# Patient Record
Sex: MALE | Race: WHITE
[De-identification: names, ages, dates, MRNs, and addresses within clinical notes are randomized per-mention and may not be internally consistent; named-entity substitution may affect disease eponyms.]

---

## 2017-07-21 ENCOUNTER — HOSPITAL ENCOUNTER (INPATIENT)
Dept: HOSPITAL 74 - YASAS | Age: 52
LOS: 5 days | Discharge: HOME | DRG: 773 | End: 2017-07-26
Attending: INTERNAL MEDICINE | Admitting: INTERNAL MEDICINE
Payer: COMMERCIAL

## 2017-07-21 VITALS — BODY MASS INDEX: 20 KG/M2

## 2017-07-21 DIAGNOSIS — F17.213: ICD-10-CM

## 2017-07-21 DIAGNOSIS — R82.90: ICD-10-CM

## 2017-07-21 DIAGNOSIS — F12.20: ICD-10-CM

## 2017-07-21 DIAGNOSIS — F11.23: Primary | ICD-10-CM

## 2017-07-21 DIAGNOSIS — Z99.89: ICD-10-CM

## 2017-07-21 DIAGNOSIS — G62.9: ICD-10-CM

## 2017-07-21 DIAGNOSIS — J45.20: ICD-10-CM

## 2017-07-21 DIAGNOSIS — R26.81: ICD-10-CM

## 2017-07-21 DIAGNOSIS — F14.20: ICD-10-CM

## 2017-07-21 DIAGNOSIS — R00.1: ICD-10-CM

## 2017-07-21 LAB
APPEARANCE UR: CLEAR
BACTERIA #/AREA URNS HPF: (no result) /HPF
BILIRUB UR STRIP.AUTO-MCNC: NEGATIVE MG/DL
CAOX CRY URNS QL MICRO: (no result) /HPF
COLOR UR: (no result)
HYALINE CASTS URNS QL MICRO: 2 /LPF
KETONES UR QL STRIP: (no result)
LEUKOCYTE ESTERASE UR QL STRIP.AUTO: NEGATIVE
MUCOUS THREADS URNS QL MICRO: (no result)
NITRITE UR QL STRIP: NEGATIVE
PH UR: 5 [PH] (ref 5–8)
PROT UR QL STRIP: (no result)
PROT UR QL STRIP: NEGATIVE
RBC # BLD AUTO: 4 /HPF (ref 0–3)
RBC # UR STRIP: NEGATIVE /UL
UROBILINOGEN UR STRIP-MCNC: NEGATIVE MG/DL (ref 0.2–1)
WBC # UR AUTO: 2 /HPF (ref 3–5)

## 2017-07-21 PROCEDURE — HZ2ZZZZ DETOXIFICATION SERVICES FOR SUBSTANCE ABUSE TREATMENT: ICD-10-PCS | Performed by: INTERNAL MEDICINE

## 2017-07-21 RX ADMIN — Medication SCH MG: at 22:17

## 2017-07-21 RX ADMIN — GABAPENTIN SCH MG: 100 CAPSULE ORAL at 22:19

## 2017-07-21 RX ADMIN — NAPROXEN SCH: 250 TABLET ORAL at 22:18
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
## 2017-07-22 LAB
ALBUMIN SERPL-MCNC: 3 G/DL (ref 3.4–5)
ALP SERPL-CCNC: 78 U/L (ref 45–117)
ALT SERPL-CCNC: 10 U/L (ref 12–78)
ANION GAP SERPL CALC-SCNC: 5 MMOL/L (ref 8–16)
AST SERPL-CCNC: 13 U/L (ref 15–37)
BILIRUB SERPL-MCNC: 0.5 MG/DL (ref 0.2–1)
CALCIUM SERPL-MCNC: 8.3 MG/DL (ref 8.5–10.1)
CO2 SERPL-SCNC: 31 MMOL/L (ref 21–32)
CREAT SERPL-MCNC: 0.9 MG/DL (ref 0.7–1.3)
DEPRECATED RDW RBC AUTO: 15.4 % (ref 11.9–15.9)
GLUCOSE SERPL-MCNC: 105 MG/DL (ref 74–106)
HIV 1 & 2 AB: NEGATIVE
HIV 1 AGP24: NEGATIVE
MCH RBC QN AUTO: 27.3 PG (ref 25.7–33.7)
MCHC RBC AUTO-ENTMCNC: 32.9 G/DL (ref 32–35.9)
MCV RBC: 82.9 FL (ref 80–96)
PLATELET # BLD AUTO: 241 K/MM3 (ref 134–434)
PMV BLD: 8.2 FL (ref 7.5–11.1)
PROT SERPL-MCNC: 5.7 G/DL (ref 6.4–8.2)
SP GR UR: >= 1.03 (ref 1–1.02)
WBC # BLD AUTO: 6.6 K/MM3 (ref 4–10)

## 2017-07-22 RX ADMIN — NICOTINE SCH MG: 14 PATCH, EXTENDED RELEASE TRANSDERMAL at 10:15

## 2017-07-22 RX ADMIN — Medication SCH TAB: at 10:14

## 2017-07-22 RX ADMIN — Medication SCH MG: at 22:16

## 2017-07-22 RX ADMIN — NAPROXEN SCH MG: 250 TABLET ORAL at 10:14

## 2017-07-22 RX ADMIN — GABAPENTIN SCH MG: 100 CAPSULE ORAL at 22:16

## 2017-07-22 RX ADMIN — GABAPENTIN SCH MG: 100 CAPSULE ORAL at 10:13

## 2017-07-22 RX ADMIN — NAPROXEN SCH: 250 TABLET ORAL at 22:17
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
## 2017-07-23 RX ADMIN — GABAPENTIN SCH MG: 100 CAPSULE ORAL at 11:22

## 2017-07-23 RX ADMIN — Medication SCH MG: at 22:09

## 2017-07-23 RX ADMIN — Medication SCH TAB: at 11:22

## 2017-07-23 RX ADMIN — GABAPENTIN SCH MG: 100 CAPSULE ORAL at 22:11

## 2017-07-23 RX ADMIN — NICOTINE SCH: 14 PATCH, EXTENDED RELEASE TRANSDERMAL at 11:49

## 2017-07-23 RX ADMIN — NAPROXEN SCH: 250 TABLET ORAL at 11:23

## 2017-07-23 RX ADMIN — NAPROXEN SCH MG: 250 TABLET ORAL at 22:10

## 2017-07-24 LAB
APPEARANCE UR: CLEAR
BILIRUB UR STRIP.AUTO-MCNC: NEGATIVE MG/DL
COLOR UR: (no result)
KETONES UR QL STRIP: NEGATIVE
LEUKOCYTE ESTERASE UR QL STRIP.AUTO: NEGATIVE
NITRITE UR QL STRIP: NEGATIVE
PH UR: 7 [PH] (ref 5–8)
PROT UR QL STRIP: NEGATIVE
PROT UR QL STRIP: NEGATIVE
RBC # UR STRIP: NEGATIVE /UL
SP GR UR: 1.02 (ref 1–1.02)
UROBILINOGEN UR STRIP-MCNC: NEGATIVE MG/DL (ref 0.2–1)

## 2017-07-24 RX ADMIN — Medication SCH TAB: at 10:29

## 2017-07-24 RX ADMIN — GABAPENTIN SCH MG: 100 CAPSULE ORAL at 10:29

## 2017-07-24 RX ADMIN — NAPROXEN SCH MG: 250 TABLET ORAL at 22:08

## 2017-07-24 RX ADMIN — GABAPENTIN SCH MG: 100 CAPSULE ORAL at 22:08

## 2017-07-24 RX ADMIN — NAPROXEN SCH MG: 250 TABLET ORAL at 10:28

## 2017-07-24 RX ADMIN — NICOTINE SCH: 14 PATCH, EXTENDED RELEASE TRANSDERMAL at 10:29

## 2017-07-24 RX ADMIN — Medication SCH MG: at 22:08

## 2017-07-25 RX ADMIN — Medication SCH TAB: at 10:24

## 2017-07-25 RX ADMIN — Medication SCH MG: at 22:22

## 2017-07-25 RX ADMIN — NAPROXEN SCH MG: 250 TABLET ORAL at 22:22

## 2017-07-25 RX ADMIN — NAPROXEN SCH: 250 TABLET ORAL at 10:24

## 2017-07-25 RX ADMIN — GABAPENTIN SCH MG: 100 CAPSULE ORAL at 22:22

## 2017-07-25 RX ADMIN — GABAPENTIN SCH MG: 100 CAPSULE ORAL at 10:24

## 2017-07-25 RX ADMIN — NICOTINE SCH: 14 PATCH, EXTENDED RELEASE TRANSDERMAL at 10:24

## 2017-07-26 VITALS — DIASTOLIC BLOOD PRESSURE: 80 MMHG | TEMPERATURE: 97.4 F | SYSTOLIC BLOOD PRESSURE: 140 MMHG | HEART RATE: 68 BPM

## 2017-07-26 RX ADMIN — NICOTINE SCH: 14 PATCH, EXTENDED RELEASE TRANSDERMAL at 10:22

## 2017-07-26 RX ADMIN — NAPROXEN SCH: 250 TABLET ORAL at 10:22

## 2017-07-26 RX ADMIN — GABAPENTIN SCH MG: 100 CAPSULE ORAL at 10:22

## 2017-07-26 RX ADMIN — Medication SCH TAB: at 10:22

## 2017-07-26 NOTE — EKG
Test Reason : 

Blood Pressure : ***/*** mmHG

Vent. Rate : 057 BPM     Atrial Rate : 057 BPM

   P-R Int : 120 ms          QRS Dur : 086 ms

    QT Int : 396 ms       P-R-T Axes : 077 075 054 degrees

   QTc Int : 385 ms

 

SINUS BRADYCARDIA

OTHERWISE NORMAL ECG

NO PREVIOUS ECGS AVAILABLE

Confirmed by SHEILA FIORE, FAROOQ ([id number]) on 7/26/2017 12:30:16 PM

 

Referred By:             Confirmed By:FAROOQ SOSA MD

## 2017-07-26 NOTE — DS
[facility] Detox Discharge Summary


Admission Date: 


07/21/17





Discharge Date: 07/26/17





- History


Present History: Cannabis Dependence, Cocaine Dependence, Opioid Dependence


Additional Comments: 


DETOX COMPLETED. ALERT O X 3. NAD. PT  STATES WILL PREFERRABLY UTILIZE [facility]. INSTRUCTED TO FOLLOW UP AT [facility] 

IN [facility] AT [address] [place] [address] FOR MEDICAL MANAGEMENT.


Pertinent Past History: 


ASTHMA


NEUROPATHY





- Physical Exam Results


Vital Signs: 


 Vital Signs











Temperature  97.4 F L  07/26/17 09:29


 


Pulse Rate  68   07/26/17 09:29


 


Respiratory Rate  18   07/26/17 09:29


 


Blood Pressure  140/80   07/26/17 09:29


 


O2 Sat by Pulse Oximetry (%)      











Pertinent Admission Physical Exam Findings: 


WITHDRAWAL SX





- Treatment


Hospital Course: Detox Protocol Followed, Detoxed Safely, Responded well, 

Discharged Condition Good, Rehab Referral Accepted


Patient has Accepted a Rehab Referral to: [facility]





- Medication


Discharge Medications: 


Ambulatory Orders





Albuterol Sulfate Inhaler - [Ventolin Hfa Inhaler -] 2 inh PO Q6H 07/21/17 











- Diagnosis


(1) Cocaine dependence


Status: Acute   Qualifiers: 


     Substance use status: uncomplicated        Qualified Code(s): F14.20 - 

Cocaine dependence, uncomplicated  





(2) Marihuana dependence


Status: Acute





(3) Nicotine dependence


Status: Acute   Qualifiers: 


     Nicotine product type: cigarettes     Substance use status: in withdrawal 

       Qualified Code(s): F17.213 - Nicotine dependence, cigarettes, with 

withdrawal  





(4) Opioid dependence with withdrawal


Status: Acute





(5) Asthma


Status: Chronic   Qualifiers: 


     Asthma severity: mild intermittent     Asthma complication type: 

uncomplicated        Qualified Code(s): J45.20 - Mild intermittent asthma, 

uncomplicated  





(6) Neuropathy


Status: Chronic








- AMA


Did Patient Leave Against Medical Advice: No

## 2017-10-22 ENCOUNTER — HOSPITAL ENCOUNTER (INPATIENT)
Dept: HOSPITAL 74 - YASAS | Age: 52
LOS: 5 days | Discharge: HOME | DRG: 773 | End: 2017-10-27
Attending: INTERNAL MEDICINE | Admitting: INTERNAL MEDICINE
Payer: COMMERCIAL

## 2017-10-22 VITALS — BODY MASS INDEX: 20 KG/M2

## 2017-10-22 DIAGNOSIS — F12.20: ICD-10-CM

## 2017-10-22 DIAGNOSIS — J45.909: ICD-10-CM

## 2017-10-22 DIAGNOSIS — F19.24: ICD-10-CM

## 2017-10-22 DIAGNOSIS — R76.11: ICD-10-CM

## 2017-10-22 DIAGNOSIS — F14.20: ICD-10-CM

## 2017-10-22 DIAGNOSIS — R26.81: ICD-10-CM

## 2017-10-22 DIAGNOSIS — Z99.89: ICD-10-CM

## 2017-10-22 DIAGNOSIS — F11.23: Primary | ICD-10-CM

## 2017-10-22 DIAGNOSIS — Z21: ICD-10-CM

## 2017-10-22 DIAGNOSIS — F17.213: ICD-10-CM

## 2017-10-22 DIAGNOSIS — G62.9: ICD-10-CM

## 2017-10-22 LAB
APPEARANCE UR: CLEAR
BILIRUB UR STRIP.AUTO-MCNC: NEGATIVE MG/DL
COLOR UR: YELLOW
KETONES UR QL STRIP: NEGATIVE
MUCOUS THREADS URNS QL MICRO: (no result)
NITRITE UR QL STRIP: NEGATIVE
PH UR: 6 [PH] (ref 5–8)
PROT UR QL STRIP: NEGATIVE
PROT UR QL STRIP: NEGATIVE
RBC # BLD AUTO: 5 /HPF (ref 0–3)
RBC # UR STRIP: (no result) /UL
SP GR UR: 1.02 (ref 1–1.02)
UROBILINOGEN UR STRIP-MCNC: NEGATIVE MG/DL (ref 0.2–1)
WBC # UR AUTO: 1 /HPF (ref 3–5)

## 2017-10-22 PROCEDURE — HZ2ZZZZ DETOXIFICATION SERVICES FOR SUBSTANCE ABUSE TREATMENT: ICD-10-PCS | Performed by: INTERNAL MEDICINE

## 2017-10-22 RX ADMIN — Medication SCH MG: at 22:39

## 2017-10-22 NOTE — HP
COWS





- Scale


Resting Pulse: 0= [place] 80 or Below


Sweatin=Flushed/Facial Moisture


Restless Observation: 1= Difficult to Sit Still


Pupil Size: 1= Pupils >than Normal


Bone or Joint Aches: 2= Severe Diffuse Aches


Runny Nose/ Eye Tearin= Runny Nose/Eyes


GI Upset > 30mins: 2= Nausea/Diarrhea


Tremor Observation: 0= None


Yawning Observation: 2= >3x During Session


Anxiety or Irritability: 1=Feels Anxious/Irritable


Goose Flesh Skin: 0=Smooth Skin


COWS Score: 13





Admission ROS S





[facility] [place]


Chief Complaint: 


WITHDRAWAL SYMPTOMS 


Allergies/Adverse Reactions: 


 Allergies











Allergy/AdvReac Type Severity Reaction Status Date / Time


 


No Known Allergies Allergy   Verified 10/22/17 13:28











History of Present Illness: 


52 Y.O. MAN WITH HISTORY OF HEROIN AND COCAINE DEPENDENCE IS HERE SEEKING 

DETOX.  HE WAS LAST HERE FOR DETOX IN 2017. LONGEST PERIOD CLEAN HAS BEEN 6 

YEARS. 


Exam Limitations: Physical Impairment (UNSTEADY GAIT; USES A CANE TO AMBULATE)





- Ebola screening


Have you traveled outside of the country in the last 21 days: No


Have you had contact with anyone from an Ebola affected area: No


Have you been sick,other than usual withdrawal symptoms: No


Do you have a fever: No





- Review of Systems


Constitutional: Loss of Appetite, Unintentional Wgt. Loss


EENT: reports: Blurred Vision, Tearing


Respiratory: reports: No Symptoms reported


Cardiac: reports: No Symptoms Reported


GI: reports: No Symptoms Reported


: reports: No Symptoms Reported


Musculoskeletal: reports: Joint Pain, Joint Swelling


Integumentary: reports: No Symptoms Reported


Neuro: reports: No Symptoms reported


Endocrine: reports: No Symptoms Reported


Hematology: reports: No Symptoms Reported


Psychiatric: reports: Judgement Intact, Mood/Affect Appropiate, Orientated x3


Other Systems: Reviewed and Negative





Patient History





- Patient Medical History


Hx Anemia: No


Hx Asthma: Yes


Hx Chronic Obstructive Pulmonary Disease (COPD): No


Hx Cancer: No


Hx Cardiac Disorders: No


Hx Congestive Heart Failure: No


Hx Hypertension: No


Hx Hypercholesterolemia: No


Hx Pacemaker: No


HX Cerebrovascular Accident: No


Hx Seizures: No


Hx Dementia: No


Hx Diabetes: No


Hx Gastrointestinal Disorders: No


Hx Liver Disease: No


Hx Genitourinary Disorders: No


Hx Sexually Transmitted Disorders: No


Hx Renal Disease (ESRD): No


Hx Thyroid Disease: No


Hx Human Immunodeficiency Virus (HIV): Yes (DX IN )


Hx Hepatitis C: No


Hx Depression: Yes


Hx Suicide Attempt: No


Hx Bipolar Disorder: No


Hx Schizophrenia: No





- Patient Surgical History


Past Surgical History: No


Hx Neurologic Surgery: No


Hx Cataract Extraction: No


Hx Cardiac Surgery: No


Hx Lung Surgery: No


Hx Breast Surgery: No


Hx Breast Biopsy: No


Hx Abdominal Surgery: No


Hx Appendectomy: No


Hx Cholecystectomy: No


Hx Genitourinary Surgery: No


Hx  Section: No


Hx Orthopedic Surgery: No


Anesthesia Reaction: No





- PPD History


Previous Implant?: No


Results: CXR:2017


PPD to be Administered?: No





- Reproductive History


Patient is a Female of Child Bearing Age (11 -55 yrs old): No





- Smoking Cessation


Smoking history: Current every day smoker


Have you smoked in the past 12 months: Yes


Aproximately how many cigarettes per day: 6


Hx Chewing Tobacco Use: No


Initiated information on smoking cessation: Yes


'Breaking Loose' booklet given: 10/22/17





- Substance & Tx. History


Hx Alcohol Use: No


Hx Substance Use: Yes


Substance Use Type: Cocaine, Heroin


Hx Substance Use Treatment: Yes (DETOX: 2017; REHAB: )





- Substances Abused


  ** Alcohol


Route: Inhalation


Frequency: 1-3 times last 30 days


Amount used: LIQUOR- 1 PINT,


Age of first use: 12


Date of Last Use: 10/20/17





  ** Heroin


Route: Inhalation


Frequency: Daily


Amount used: 3 BAGS


Age of first use: 38


Date of Last Use: 10/22/17





Family Disease History





- Family Disease History


Family Disease History: Diabetes: Father, CA: Mother, Brother, Other: Brother





Admission Physical Exam [facility]





- Vital Signs


Vital Signs: 


 Vital Signs - 24 hr











  10/22/17





  12:49


 


Temperature 97.3 F L


 


Pulse Rate 67


 


Respiratory 16





Rate 


 


Blood Pressure 131/78














- Physical


General Appearance: Yes: Nourished, Appropriately Dressed


HEENTM: Yes: Hearing grossly Normal, Normal ENT Inspection, Normocephalic, 

Normal Voice


Respiratory: Yes: Chest Non-Tender, Lungs Clear, Normal Breath Sounds, No 

Respiratory Distress, No Accessory Muscle Use


Neck: Yes: No masses,lesions,Nodules, Trachea in good position


Breast: Yes: Breast Exam Deferred


Cardiology: Yes: Regular Rhythm, Regular Rate


Abdominal: Yes: Normal Bowel Sounds, Non Tender


Genitourinary: Yes: Other (NO COMPLAINTS REPORTED)


Back: Yes: Normal Inspection


Musculoskeletal: Yes: Other (UNSTEADY GAIT)


Extremities: Yes: Normal Capillary Refill, Normal Inspection, Non-Tender


Neurological: Yes: CNs II-XII NML intact, Fully Oriented, Alert, Motor Strength 

5/5, Normal Mood/Affect, Normal Response


Integumentary: Yes: Normal Color, Dry, Warm


Lymphatic: Yes: Within Normal Limits





- Diagnostic


(1) Cocaine dependence


Current Visit: Yes   Status: Chronic   Qualifiers: 


     Substance use status: uncomplicated        Qualified Code(s): F14.20 - 

Cocaine dependence, uncomplicated; F14.20 - Cocaine dependence, uncomplicated; 

F14.20 - Cocaine dependence, uncomplicated  





(2) Nicotine dependence


Current Visit: Yes   Status: Chronic   Qualifiers: 


     Nicotine product type: cigarettes     Substance use status: in withdrawal 

       Qualified Code(s): F17.213 - Nicotine dependence, cigarettes, with 

withdrawal; F17.213 - Nicotine dependence, cigarettes, with withdrawal  





(3) Opioid dependence with withdrawal


Current Visit: Yes   Status: Chronic





(4) Asthma


Current Visit: Yes   Status: Chronic   Qualifiers: 


     Asthma severity: mild intermittent     Asthma complication type: 

uncomplicated        Qualified Code(s): J45.20 - Mild intermittent asthma, 

uncomplicated; J45.20 - Mild intermittent asthma, uncomplicated; J45.20 - Mild 

intermittent asthma, uncomplicated  





(5) History of positive PPD


Current Visit: Yes   Status: Chronic





(6) Unsteady gait


Current Visit: Yes   Status: Chronic





(7) HIV (human immunodeficiency virus infection)


Current Visit: Yes   Status: Chronic








Cleared for Admission [facility]





- Detox or Rehab


[facility] Level of Care: Medically Managed


Detox Regimen/Protocol: Methadone





[facility] Breath Alcohol Content


Breath Alcohol Content: 0





Urine Drug Screen





- Results


Drug Screen Negative: No


Urine Drug Screen Results: THC-Marijuana, TRE-Cocaine, OPI-Opiates

## 2017-10-23 LAB
ALBUMIN SERPL-MCNC: 2.8 G/DL (ref 3.4–5)
ALP SERPL-CCNC: 81 U/L (ref 45–117)
ALT SERPL-CCNC: 9 U/L (ref 12–78)
ANION GAP SERPL CALC-SCNC: 5 MMOL/L (ref 8–16)
APPEARANCE UR: (no result)
AST SERPL-CCNC: 10 U/L (ref 15–37)
BILIRUB SERPL-MCNC: 0.5 MG/DL (ref 0.2–1)
BILIRUB UR STRIP.AUTO-MCNC: NEGATIVE MG/DL
CALCIUM SERPL-MCNC: 8.2 MG/DL (ref 8.5–10.1)
CO2 SERPL-SCNC: 27 MMOL/L (ref 21–32)
COLOR UR: (no result)
CREAT SERPL-MCNC: 0.9 MG/DL (ref 0.7–1.3)
DEPRECATED RDW RBC AUTO: 15.5 % (ref 11.9–15.9)
GLUCOSE SERPL-MCNC: 81 MG/DL (ref 74–106)
KETONES UR QL STRIP: (no result)
MCH RBC QN AUTO: 26.7 PG (ref 25.7–33.7)
MCHC RBC AUTO-ENTMCNC: 32.5 G/DL (ref 32–35.9)
MCV RBC: 82.2 FL (ref 80–96)
NITRITE UR QL STRIP: NEGATIVE
PH UR: 5 [PH] (ref 5–8)
PLATELET # BLD AUTO: 276 K/MM3 (ref 134–434)
PMV BLD: 7.8 FL (ref 7.5–11.1)
PROT SERPL-MCNC: 5.7 G/DL (ref 6.4–8.2)
PROT UR QL STRIP: NEGATIVE
PROT UR QL STRIP: NEGATIVE
RBC # UR STRIP: NEGATIVE /UL
SP GR UR: 1.02 (ref 1–1.02)
UROBILINOGEN UR STRIP-MCNC: NEGATIVE MG/DL (ref 0.2–1)
WBC # BLD AUTO: 6.5 K/MM3 (ref 4–10)

## 2017-10-23 RX ADMIN — Medication SCH MG: at 22:24

## 2017-10-23 RX ADMIN — Medication SCH TAB: at 10:16

## 2017-10-23 NOTE — PN
[facility] COWS





- Scale


Resting Pulse: 0= [place] 80 or Below


Sweatin=Flushed/Facial Moisture


Restless Observation: 1= Difficult to Sit Still


Pupil Size: 0= Normal to Room Light


Bone or Joint Aches: 1= Mild Discomfort


Runny Nose/ Eye Tearin= Nasal Congestion


GI Upset > 30mins: 1= Stomach Cramp


Tremor Observation of Outstretched Hands: 2= Slight Tremor Visible


Yawning Observation: 1= 1-2x During Session


Anxiety or Irritability: 2=Irritable/Anxious


Goose Flesh Skin: 3=Piloerection


COWS Score: 14





[facility]HS Progress Note (SOAP)


Subjective: 


Fatigue, Tremors, Sweating.


Objective: 


PT. A & O X 3, OBSERVED AMBULATING ON UNIT WITH ASSISTANCE OF A CANE. NO ACUTE 

DISTRESS.





10/23/17 12:11


 Vital Signs











Temperature  96.5 F L  10/23/17 09:38


 


Pulse Rate  73   10/23/17 09:38


 


Respiratory Rate  18   10/23/17 09:38


 


Blood Pressure  121/73   10/23/17 09:38


 


O2 Sat by Pulse Oximetry (%)      








 Laboratory Tests











  10/22/17 10/23/17 10/23/17





  11:10 07:00 07:00


 


WBC   6.5 


 


RBC   4.92 


 


Hgb   13.1 


 


Hct   40.4 


 


MCV   82.2 


 


MCH   26.7 


 


MCHC   32.5 


 


RDW   15.5 


 


Plt Count   276 


 


MPV   7.8 


 


Sodium    144


 


Potassium    3.9


 


Chloride    112 H


 


Carbon Dioxide    27


 


Anion Gap    5 L


 


BUN    14


 


Creatinine    0.9


 


Creat Clearance w eGFR    > 60


 


Random Glucose    81  D


 


Calcium    8.2 L


 


Total Bilirubin    0.5


 


AST    10 L D


 


ALT    9 L


 


Alkaline Phosphatase    81


 


Total Protein    5.7 L


 


Albumin    2.8 L


 


Urine Color  Yellow  


 


Urine Appearance  Clear  


 


Urine pH  6.0  


 


Ur Specific Gravity  1.025  


 


Urine Protein  Negative  


 


Urine Glucose (UA)  Negative  


 


Urine Ketones  Negative  


 


Urine Blood  1+ H  


 


Urine Nitrite  Negative  


 


Urine Bilirubin  Negative  


 


Urine Urobilinogen  Negative  


 


Ur Leukocyte Esterase  Negative  


 


Urine RBC  5  


 


Urine WBC  1  


 


Urine Mucus  Rare  


 


RPR Titer   














  10/23/17





  07:00


 


WBC 


 


RBC 


 


Hgb 


 


Hct 


 


MCV 


 


MCH 


 


MCHC 


 


RDW 


 


Plt Count 


 


MPV 


 


Sodium 


 


Potassium 


 


Chloride 


 


Carbon Dioxide 


 


Anion Gap 


 


BUN 


 


Creatinine 


 


Creat Clearance w eGFR 


 


Random Glucose 


 


Calcium 


 


Total Bilirubin 


 


AST 


 


ALT 


 


Alkaline Phosphatase 


 


Total Protein 


 


Albumin 


 


Urine Color 


 


Urine Appearance 


 


Urine pH 


 


Ur Specific Gravity 


 


Urine Protein 


 


Urine Glucose (UA) 


 


Urine Ketones 


 


Urine Blood 


 


Urine Nitrite 


 


Urine Bilirubin 


 


Urine Urobilinogen 


 


Ur Leukocyte Esterase 


 


Urine RBC 


 


Urine WBC 


 


Urine Mucus 


 


RPR Titer  Nonreactive








LABS NOTED.


Assessment: 





10/23/17 12:12


WITHDRAWAL SYMPTOMS.


Plan: 


CONTINUE DETOX.


REPEAT UA FOR ELEVATED ADMISSION RBC, URINE BLOOD LEVELS.


INCREASE DAILY PO FLUID INTAKE.

## 2017-10-23 NOTE — CONSULT
[facility] Psychiatric Consult





- Data


Date of interview: 10/23/17


Admission source: [facility]


Identifying data: This is 52 years old male with no psychiatric hospitalization 

history intoxicated with:  Cocaine, Opioids and Nicotine


Substance Abuse History: Smoking history: Current every day smoker.  Have you 

smoked in the past 12 months: Yes.  Aproximately how many cigarettes per day: 

6.  Hx Chewing Tobacco Use: No.  Initiated information on smoking cessation: 

Yes.  'Breaking Loose' booklet given: 10/22/17.  - Substance & Tx. History.  Hx 

Alcohol Use: No.  Hx Substance Use: Yes.  Substance Use Type: Cocaine, Heroin.  

Hx Substance Use Treatment: Yes (DETOX: 7/2017; REHAB: 2008).  - Substances 

Abused.  ** Alcohol.  Route: Inhalation.  Frequency: 1-3 times last 30 days.  

Amount used: LIQUOR- 1 PINT,.  Age of first use: 12.  Date of Last Use: 10/20/

17.  ** Heroin.  Route: Inhalation.  Frequency: Daily.  Amount used: 3 BAGS.  

Age of first use: 38.  Date of Last Use: 10/22/17


Medical History: Denies


Psychiatric History: Denies past psychiatric history


Physical/Sexual Abuse/Trauma History: Denies


Additional Comment: Observation.  Detox Unit Care Protocol





Mental Status Exam





- Mental Status Exam


Alert and Oriented to: Person


Cognitive Function: Fair


Patient Appearance: Unkempt


Mood: Sad


Affect: Flat


Patient Behavior: Aggressive


Speech Pattern: Delayed


Voice Loudness: Mildly Soft/Quiet


Thought Process: Circumstantial


Thought Disorder: Being Controlled


Hallucinations: Denies


Suicidal Ideation: Denies


Homicidal Ideation: Denies


Insight/Judgement: Fair


Sleep: Fair


Appetite: Fair


Muscle strength/Tone: Mild Hypotonicity


Gait/Station: Shuffling


Additional Comments: Observation.  Detox Unit Care Protocol





Psychiatric Findings





- Problem List (Axis 1, 2,3)


(1) Cocaine dependence


Current Visit: Yes   Status: Chronic   Qualifiers: 


     Substance use status: uncomplicated        Qualified Code(s): F14.20 - 

Cocaine dependence, uncomplicated; F14.20 - Cocaine dependence, uncomplicated; 

F14.20 - Cocaine dependence, uncomplicated  





(2) Nicotine dependence


Current Visit: Yes   Status: Chronic   Qualifiers: 


     Nicotine product type: cigarettes     Substance use status: in withdrawal 

       Qualified Code(s): F17.213 - Nicotine dependence, cigarettes, with 

withdrawal; F17.213 - Nicotine dependence, cigarettes, with withdrawal  





(3) Opioid dependence with withdrawal


Current Visit: Yes   Status: Chronic





(4) Marihuana dependence


Current Visit: No   Status: Acute





(5) Drug-induced mood disorder


Current Visit: Yes   Status: Suspected








- Initial Treatment Plan


Initial Treatment Plan: Observation.  Detox Unit Care Protocol

## 2017-10-23 NOTE — EKG
Test Reason : 

Blood Pressure : ***/*** mmHG

Vent. Rate : 068 BPM     Atrial Rate : 068 BPM

   P-R Int : 122 ms          QRS Dur : 104 ms

    QT Int : 390 ms       P-R-T Axes : 081 078 069 degrees

   QTc Int : 414 ms

 

SINUS RHYTHM WITH MARKED SINUS ARRHYTHMIA

OTHERWISE NORMAL ECG

WHEN COMPARED WITH ECG OF 24-JUL-2017 10:09,

NO SIGNIFICANT CHANGE WAS FOUND

Confirmed by STANTON MARAVILLA MD ([id number]) on 10/23/2017 10:17:19 AM

 

Referred By:             Confirmed By:STANTON MARAVILLA MD

## 2017-10-24 RX ADMIN — Medication SCH MG: at 22:22

## 2017-10-24 RX ADMIN — Medication SCH TAB: at 10:22

## 2017-10-24 NOTE — PN
[facility] COWS





- Scale


Resting Pulse: 0= OK 80 or Below


Sweatin= Chills/Flushing


Restless Observation: 1= Difficult to Sit Still


Pupil Size: 0= Normal to Room Light


Bone or Joint Aches: 1= Mild Discomfort


Runny Nose/ Eye Tearin= Nasal Congestion


GI Upset > 30mins: 2= Nausea/Diarrhea


Tremor Observation of Outstretched Hands: 2= Slight Tremor Visible


Yawning Observation: 1= 1-2x During Session


Anxiety or Irritability: 2=Irritable/Anxious


Goose Flesh Skin: 3=Piloerection


COWS Score: 14





[facility]HS Progress Note (SOAP)


Subjective: 


Diarrhea, Tremors, Sweating.


Objective: 


PT. A & O X 2 (DISORIENTED ABOUT DAY / DATE). PT. OBSERVED AMBULATING ON UNIT. 

NO ACUTE DISTRESS.





10/24/17 11:27


 Vital Signs











Temperature  97.9 F   10/24/17 09:40


 


Pulse Rate  79   10/24/17 09:40


 


Respiratory Rate  18   10/24/17 09:40


 


Blood Pressure  132/83   10/24/17 09:40


 


O2 Sat by Pulse Oximetry (%)      








 Laboratory Tests











  10/22/17 10/23/17 10/23/17





  11:10 07:00 07:00


 


WBC   6.5 


 


RBC   4.92 


 


Hgb   13.1 


 


Hct   40.4 


 


MCV   82.2 


 


MCH   26.7 


 


MCHC   32.5 


 


RDW   15.5 


 


Plt Count   276 


 


MPV   7.8 


 


Sodium    144


 


Potassium    3.9


 


Chloride    112 H


 


Carbon Dioxide    27


 


Anion Gap    5 L


 


BUN    14


 


Creatinine    0.9


 


Creat Clearance w eGFR    > 60


 


Random Glucose    81  D


 


Calcium    8.2 L


 


Total Bilirubin    0.5


 


AST    10 L D


 


ALT    9 L


 


Alkaline Phosphatase    81


 


Total Protein    5.7 L


 


Albumin    2.8 L


 


Urine Color  Yellow  


 


Urine Appearance  Clear  


 


Urine pH  6.0  


 


Ur Specific Gravity  1.025  


 


Urine Protein  Negative  


 


Urine Glucose (UA)  Negative  


 


Urine Ketones  Negative  


 


Urine Blood  1+ H  


 


Urine Nitrite  Negative  


 


Urine Bilirubin  Negative  


 


Urine Urobilinogen  Negative  


 


Ur Leukocyte Esterase  Negative  


 


Urine RBC  5  


 


Urine WBC  1  


 


Urine Mucus  Rare  


 


RPR Titer   














  10/23/17 10/23/17





  07:00 13:20


 


WBC  


 


RBC  


 


Hgb  


 


Hct  


 


MCV  


 


MCH  


 


MCHC  


 


RDW  


 


Plt Count  


 


MPV  


 


Sodium  


 


Potassium  


 


Chloride  


 


Carbon Dioxide  


 


Anion Gap  


 


BUN  


 


Creatinine  


 


Creat Clearance w eGFR  


 


Random Glucose  


 


Calcium  


 


Total Bilirubin  


 


AST  


 


ALT  


 


Alkaline Phosphatase  


 


Total Protein  


 


Albumin  


 


Urine Color   Alicia


 


Urine Appearance   Slcloudy


 


Urine pH   5.0


 


Ur Specific Gravity   1.025


 


Urine Protein   Negative


 


Urine Glucose (UA)   Negative


 


Urine Ketones   Trace H


 


Urine Blood   Negative


 


Urine Nitrite   Negative


 


Urine Bilirubin   Negative


 


Urine Urobilinogen   Negative


 


Ur Leukocyte Esterase   Negative


 


Urine RBC  


 


Urine WBC  


 


Urine Mucus  


 


RPR Titer  Nonreactive 








LABS NOTED.


Assessment: 





10/24/17 11:28


WITHDRAWAL SYMPTOMS.


Plan: 


CONTINUE DETOX.


INCREASE DAILY PO FLUID INTAKE.

## 2017-10-25 RX ADMIN — Medication SCH TAB: at 10:26

## 2017-10-25 RX ADMIN — Medication SCH MG: at 22:19

## 2017-10-25 NOTE — PN
[facility] Progress Note (SOAP)


Subjective: 


ALERT O X 3. NAD. DETOX PROCEEDING PER PROTOCOL.


Objective: 





10/25/17 10:50


 Vital Signs











Temperature  96.4 F L  10/25/17 09:49


 


Pulse Rate  76   10/25/17 09:49


 


Respiratory Rate  18   10/25/17 09:49


 


Blood Pressure  123/87   10/25/17 09:49


 


O2 Sat by Pulse Oximetry (%)      








 Laboratory Last Values











WBC  6.5 K/mm3 (4.0-10.0)   10/23/17  07:00    


 


RBC  4.92 M/mm3 (4.00-5.60)   10/23/17  07:00    


 


Hgb  13.1 GM/dL (11.7-16.9)   10/23/17  07:00    


 


Hct  40.4 % (35.4-49)   10/23/17  07:00    


 


MCV  82.2 fl (80-96)   10/23/17  07:00    


 


MCH  26.7 pg (25.7-33.7)   10/23/17  07:00    


 


MCHC  32.5 g/dl (32.0-35.9)   10/23/17  07:00    


 


RDW  15.5 % (11.9-15.9)   10/23/17  07:00    


 


Plt Count  276 K/MM3 (134-434)   10/23/17  07:00    


 


MPV  7.8 fl (7.5-11.1)   10/23/17  07:00    


 


Sodium  144 mmol/L (136-145)   10/23/17  07:00    


 


Potassium  3.9 mmol/L (3.5-5.1)   10/23/17  07:00    


 


Chloride  112 mmol/L ()  H  10/23/17  07:00    


 


Carbon Dioxide  27 mmol/L (21-32)   10/23/17  07:00    


 


Anion Gap  5  (8-16)  L  10/23/17  07:00    


 


BUN  14 mg/dL (7-18)   10/23/17  07:00    


 


Creatinine  0.9 mg/dL (0.7-1.3)   10/23/17  07:00    


 


Creat Clearance w eGFR  > 60  (>60)   10/23/17  07:00    


 


Random Glucose  81 mg/dL ()  D 10/23/17  07:00    


 


Calcium  8.2 mg/dL (8.5-10.1)  L  10/23/17  07:00    


 


Total Bilirubin  0.5 mg/dL (0.2-1.0)   10/23/17  07:00    


 


AST  10 U/L (15-37)  L D 10/23/17  07:00    


 


ALT  9 U/L (12-78)  L  10/23/17  07:00    


 


Alkaline Phosphatase  81 U/L ()   10/23/17  07:00    


 


Total Protein  5.7 g/dl (6.4-8.2)  L  10/23/17  07:00    


 


Albumin  2.8 g/dl (3.4-5.0)  L  10/23/17  07:00    


 


Urine Color  Laicia   10/23/17  13:20    


 


Urine Appearance  Slcloudy   10/23/17  13:20    


 


Urine pH  5.0  (5.0-8.0)   10/23/17  13:20    


 


Ur Specific Gravity  1.025  (1.005-1.025)   10/23/17  13:20    


 


Urine Protein  Negative  (NEGATIVE)   10/23/17  13:20    


 


Urine Glucose (UA)  Negative  (NEGATIVE)   10/23/17  13:20    


 


Urine Ketones  Trace  (NEGATIVE)  H  10/23/17  13:20    


 


Urine Blood  Negative  (NEGATIVE)   10/23/17  13:20    


 


Urine Nitrite  Negative  (NEGATIVE)   10/23/17  13:20    


 


Urine Bilirubin  Negative  (NEGATIVE)   10/23/17  13:20    


 


Urine Urobilinogen  Negative mg/dL (0.2-1.0)   10/23/17  13:20    


 


Ur Leukocyte Esterase  Negative  (NEGATIVE)   10/23/17  13:20    


 


Urine RBC  5 /hpf (0-3)   10/22/17  11:10    


 


Urine WBC  1 /hpf (3-5)   10/22/17  11:10    


 


Urine Mucus  Rare   10/22/17  11:10    


 


RPR Titer  Nonreactive  (NONREACTIVE)   10/23/17  07:00    











Assessment: 





10/25/17 10:50


DECREASED WITHDRAWAL SX


Plan: 


CONTINUE DETOX

## 2017-10-26 RX ADMIN — Medication SCH MG: at 22:33

## 2017-10-26 RX ADMIN — Medication SCH TAB: at 10:15

## 2017-10-26 NOTE — PN
[facility] Progress Note (SOAP)


Subjective: 


DETOX PROCEEDING WELL. ALERT O X 3. SLIGHT FATIGUE.


Objective: 





10/26/17 10:31


 Vital Signs











Temperature  97.7 F   10/26/17 06:56


 


Pulse Rate  62   10/26/17 06:56


 


Respiratory Rate  18   10/26/17 06:56


 


Blood Pressure  120/72   10/26/17 06:56


 


O2 Sat by Pulse Oximetry (%)      








 Laboratory Last Values











WBC  6.5 K/mm3 (4.0-10.0)   10/23/17  07:00    


 


RBC  4.92 M/mm3 (4.00-5.60)   10/23/17  07:00    


 


Hgb  13.1 GM/dL (11.7-16.9)   10/23/17  07:00    


 


Hct  40.4 % (35.4-49)   10/23/17  07:00    


 


MCV  82.2 fl (80-96)   10/23/17  07:00    


 


MCH  26.7 pg (25.7-33.7)   10/23/17  07:00    


 


MCHC  32.5 g/dl (32.0-35.9)   10/23/17  07:00    


 


RDW  15.5 % (11.9-15.9)   10/23/17  07:00    


 


Plt Count  276 K/MM3 (134-434)   10/23/17  07:00    


 


MPV  7.8 fl (7.5-11.1)   10/23/17  07:00    


 


Sodium  144 mmol/L (136-145)   10/23/17  07:00    


 


Potassium  3.9 mmol/L (3.5-5.1)   10/23/17  07:00    


 


Chloride  112 mmol/L ()  H  10/23/17  07:00    


 


Carbon Dioxide  27 mmol/L (21-32)   10/23/17  07:00    


 


Anion Gap  5  (8-16)  L  10/23/17  07:00    


 


BUN  14 mg/dL (7-18)   10/23/17  07:00    


 


Creatinine  0.9 mg/dL (0.7-1.3)   10/23/17  07:00    


 


Creat Clearance w eGFR  > 60  (>60)   10/23/17  07:00    


 


Random Glucose  81 mg/dL ()  D 10/23/17  07:00    


 


Calcium  8.2 mg/dL (8.5-10.1)  L  10/23/17  07:00    


 


Total Bilirubin  0.5 mg/dL (0.2-1.0)   10/23/17  07:00    


 


AST  10 U/L (15-37)  L D 10/23/17  07:00    


 


ALT  9 U/L (12-78)  L  10/23/17  07:00    


 


Alkaline Phosphatase  81 U/L ()   10/23/17  07:00    


 


Total Protein  5.7 g/dl (6.4-8.2)  L  10/23/17  07:00    


 


Albumin  2.8 g/dl (3.4-5.0)  L  10/23/17  07:00    


 


Urine Color  Alicia   10/23/17  13:20    


 


Urine Appearance  Slcloudy   10/23/17  13:20    


 


Urine pH  5.0  (5.0-8.0)   10/23/17  13:20    


 


Ur Specific Gravity  1.025  (1.005-1.025)   10/23/17  13:20    


 


Urine Protein  Negative  (NEGATIVE)   10/23/17  13:20    


 


Urine Glucose (UA)  Negative  (NEGATIVE)   10/23/17  13:20    


 


Urine Ketones  Trace  (NEGATIVE)  H  10/23/17  13:20    


 


Urine Blood  Negative  (NEGATIVE)   10/23/17  13:20    


 


Urine Nitrite  Negative  (NEGATIVE)   10/23/17  13:20    


 


Urine Bilirubin  Negative  (NEGATIVE)   10/23/17  13:20    


 


Urine Urobilinogen  Negative mg/dL (0.2-1.0)   10/23/17  13:20    


 


Ur Leukocyte Esterase  Negative  (NEGATIVE)   10/23/17  13:20    


 


Urine RBC  5 /hpf (0-3)   10/22/17  11:10    


 


Urine WBC  1 /hpf (3-5)   10/22/17  11:10    


 


Urine Mucus  Rare   10/22/17  11:10    


 


RPR Titer  Nonreactive  (NONREACTIVE)   10/23/17  07:00    











Assessment: 





10/26/17 10:31


DECREASED WITHDRAWAL SX


Plan: 


CONTINUE DETOX

## 2017-10-27 VITALS — SYSTOLIC BLOOD PRESSURE: 134 MMHG | TEMPERATURE: 96.5 F | DIASTOLIC BLOOD PRESSURE: 76 MMHG | HEART RATE: 56 BPM

## 2017-10-27 RX ADMIN — Medication SCH TAB: at 09:32

## 2017-10-27 NOTE — DS
[facility] Detox Discharge Summary


Admission Date: 


10/22/17





Discharge Date: 10/27/17





- History


Present History: Cannabis Dependence, Cocaine Dependence, Opioid Dependence


Pertinent Past History: 


asthma, nicotine dependence, anxiety, insomnia, depression, HIV+, peripheral 

neuropathy with unsteady gait, PPD+





- Physical Exam Results


Vital Signs: 


 Vital Signs











Temperature  96.5 F L  10/27/17 06:10


 


Pulse Rate  56 L  10/27/17 06:10


 


Respiratory Rate  16   10/27/17 06:10


 


Blood Pressure  134/76   10/27/17 06:10


 


O2 Sat by Pulse Oximetry (%)      











Pertinent Admission Physical Exam Findings: 


withdrawal sx





- Treatment


Hospital Course: Detox Protocol Followed, Detoxed Safely, Responded well, 

Discharged Condition Good, Rehab Referral Accepted


Patient has Accepted a Rehab Referral to: Yes, outpatient [facility]





- Medication


Discharge Medications: 


Ambulatory Orders





Albuterol Sulfate Inhaler - [Ventolin Hfa Inhaler -] 2 inh PO Q6H 07/21/17 


Efavirenz/Emtricitab/Tenofovir [Atripla -] 1 tab PO DAILY 10/22/17 











- Diagnosis


(1) Cocaine dependence


Current Visit: Yes   Status: Acute   Qualifiers: 


     Substance use status: uncomplicated        Qualified Code(s): F14.20 - 

Cocaine dependence, uncomplicated; F14.20 - Cocaine dependence, uncomplicated; 

F14.20 - Cocaine dependence, uncomplicated  





(2) Nicotine dependence


Current Visit: Yes   Status: Acute   Qualifiers: 


     Nicotine product type: cigarettes     Substance use status: in withdrawal 

       Qualified Code(s): F17.213 - Nicotine dependence, cigarettes, with 

withdrawal; F17.213 - Nicotine dependence, cigarettes, with withdrawal  





(3) Opioid dependence with withdrawal


Current Visit: Yes   Status: Acute





(4) HIV (human immunodeficiency virus infection)


Current Visit: Yes   Status: Chronic





(5) History of positive PPD


Current Visit: Yes   Status: Chronic





(6) Neuropathy


Current Visit: Yes   Status: Chronic





(7) Drug-induced mood disorder


Current Visit: Yes   Status: Suspected





(8) Marihuana dependence


Current Visit: No   Status: Acute








- AMA


Did Patient Leave Against Medical Advice: No

## 2018-03-07 ENCOUNTER — HOSPITAL ENCOUNTER (INPATIENT)
Dept: HOSPITAL 74 - YASAS | Age: 53
LOS: 5 days | Discharge: HOME | DRG: 773 | End: 2018-03-12
Attending: INTERNAL MEDICINE | Admitting: INTERNAL MEDICINE
Payer: COMMERCIAL

## 2018-03-07 VITALS — BODY MASS INDEX: 20.7 KG/M2

## 2018-03-07 DIAGNOSIS — F14.20: ICD-10-CM

## 2018-03-07 DIAGNOSIS — F11.23: Primary | ICD-10-CM

## 2018-03-07 DIAGNOSIS — F12.20: ICD-10-CM

## 2018-03-07 DIAGNOSIS — J45.909: ICD-10-CM

## 2018-03-07 DIAGNOSIS — F19.24: ICD-10-CM

## 2018-03-07 DIAGNOSIS — Z21: ICD-10-CM

## 2018-03-07 DIAGNOSIS — F17.213: ICD-10-CM

## 2018-03-07 DIAGNOSIS — R76.11: ICD-10-CM

## 2018-03-07 DIAGNOSIS — G62.9: ICD-10-CM

## 2018-03-07 LAB
APPEARANCE UR: (no result)
BILIRUB UR STRIP.AUTO-MCNC: NEGATIVE MG/DL
COLOR UR: (no result)
EPITH CASTS URNS QL MICRO: (no result) /HPF
KETONES UR QL STRIP: NEGATIVE
LEUKOCYTE ESTERASE UR QL STRIP.AUTO: NEGATIVE
MUCOUS THREADS URNS QL MICRO: (no result)
NITRITE UR QL STRIP: NEGATIVE
PH UR: 5 [PH] (ref 5–8)
PROT UR QL STRIP: NEGATIVE
PROT UR QL STRIP: NEGATIVE
RBC # UR STRIP: (no result) /UL
SP GR UR: 1.02 (ref 1–1.03)
UROBILINOGEN UR STRIP-MCNC: NEGATIVE MG/DL (ref 0.2–1)

## 2018-03-07 PROCEDURE — HZ2ZZZZ DETOXIFICATION SERVICES FOR SUBSTANCE ABUSE TREATMENT: ICD-10-PCS | Performed by: INTERNAL MEDICINE

## 2018-03-07 RX ADMIN — ALBUTEROL SULFATE SCH PUFF: 90 AEROSOL, METERED RESPIRATORY (INHALATION) at 17:26

## 2018-03-07 RX ADMIN — GABAPENTIN SCH MG: 100 CAPSULE ORAL at 22:27

## 2018-03-07 RX ADMIN — Medication SCH MG: at 22:27

## 2018-03-07 RX ADMIN — ALBUTEROL SULFATE SCH: 90 AEROSOL, METERED RESPIRATORY (INHALATION) at 22:04

## 2018-03-07 NOTE — HP
COWS





- Scale


Resting Pulse: 0= [place] 80 or Below


Sweatin=Flushed/Facial Moisture


Restless Observation: 1= Difficult to Sit Still


Pupil Size: 1= Pupils >than Normal


Bone or Joint Aches: 4=Acute Joint/Muscle Pain


Runny Nose/ Eye Tearin= Runny Nose/Eyes


GI Upset > 30mins: 2= Nausea/Diarrhea (diarrhea x 4)


Tremor Observation: 2= Slight Tremor Visible


Yawning Observation: 0= None


Anxiety or Irritability: 2=Irritable/Anxious


Goose Flesh Skin: 0=Smooth Skin


COWS Score: 16





Admission [facility]





- \[facility] [place] [facility]\""


Chief Complaint: 





Opioid withdrawal symptoms


Allergies/Adverse Reactions: 


 Allergies











Allergy/AdvReac Type Severity Reaction Status Date / Time


 


No Known Allergies Allergy   Verified 18 15:03











History of Present Illness: 





52 years old male with a long history of heroine and cocaine dependence is 

seeking admission to detox. Patient has been in previous detox and reports 6 

years of sobriety. He has medical history of asthma, right leg neuropathy, HIV+ 

and depression. He denies suicide attempt and suicidal ideation at this time.





- Ebola screening


Have you traveled outside of the country in the last 21 days: No (N)


Have you had contact with anyone from an Ebola affected area: No


Have you been sick,other than usual withdrawal symptoms: No


Do you have a fever: No





- Review of Systems


Constitutional: Chills, Loss of Appetite, Malaise, Night Sweats, Changes in 

sleep


EENT: reports: Nose Congestion, Sinus Pressure


Respiratory: reports: No Symptoms reported


Cardiac: reports: No Symptoms Reported


GI: reports: Diarrhea, Nausea, Poor Appetite, Poor Fluid Intake


: reports: No Symptoms Reported


Musculoskeletal: reports: Back Pain, Muscle Pain, Muscle Weakness


Integumentary: reports: Flushing


Neuro: reports: Tingling, Tremors


Endocrine: reports: No Symptoms Reported


Hematology: reports: No Symptoms Reported


Psychiatric: reports: Orientated x3, Anxious


Other Systems: Reviewed and Negative





Patient History





- Patient Medical History


Hx Anemia: No


Hx Asthma: Yes (On Albuterol)


Hx Chronic Obstructive Pulmonary Disease (COPD): No


Hx Cancer: No


Hx Cardiac Disorders: No


Hx Congestive Heart Failure: No


Hx Hypertension: No


Hx Hypercholesterolemia: No


Hx Pacemaker: No


HX Cerebrovascular Accident: No


Hx Seizures: No


Hx Dementia: No


Hx Diabetes: No


Hx Gastrointestinal Disorders: No


Hx Liver Disease: No


Hx Genitourinary Disorders: No


Hx Sexually Transmitted Disorders: No


Hx Renal Disease (ESRD): No


Hx Thyroid Disease: No


Hx Human Immunodeficiency Virus (HIV): Yes (DX IN . Atripla was stopped by 

PMD. Not on meds now)


Hx Hepatitis C: No


Hx Depression: Yes (Not on meds)


Hx Suicide Attempt: No


Hx Bipolar Disorder: No


Hx Schizophrenia: No


Other Medical History: Right leg neuropathy - On Neurontin





- Patient Surgical History


Past Surgical History: No


Hx Neurologic Surgery: No


Hx Cataract Extraction: No


Hx Cardiac Surgery: No


Hx Lung Surgery: No


Hx Abdominal Surgery: No


Hx Appendectomy: No


Hx Cholecystectomy: No


Hx Genitourinary Surgery: No


Hx Orthopedic Surgery: No


Anesthesia Reaction: No





- PPD History


Previous Implant?: No (PPD POSITIVE . TREATED WITH INH IN )


Results: CXR:2017





- Reproductive History


Patient is a Female of Child Bearing Age (11 -55 yrs old): No (Male)





- Smoking Cessation


Smoking history: Current every day smoker


Have you smoked in the past 12 months: Yes


Aproximately how many cigarettes per day: 5


Hx Chewing Tobacco Use: No


Initiated information on smoking cessation: Yes


'Breaking Loose' booklet given: 18





- Substance & Tx. History


Hx Alcohol Use: No


Hx Substance Use: Yes


Substance Use Type: Cocaine, Heroin, Marijuana


Hx Substance Use Treatment: Yes ([facility] 10/22 - 10/27/2017)





- Substances Abused


  ** Heroin


Route: Inhalation


Frequency: Daily


Amount used: 2 bags


Age of first use: 35


Date of Last Use: 18





  ** Cocaine


Route: Smoking


Frequency: 1-2 times per week


Amount used: 3 bags


Age of first use: 15


Date of Last Use: 18





  ** Marijuana/Hashish


Route: Smoking


Frequency: 3-6 times per week


Amount used: $10


Age of first use: 11


Date of Last Use: 18





Family Disease History





- Family Disease History


Family Disease History: Diabetes: Father, CA: Mother, Brother, Other: Brother





Admission Physical Exam [facility]





- Vital Signs


Vital Signs: 


 Vital Signs - 24 hr











  18





  14:10


 


Temperature 97.8 F


 


Pulse Rate 63


 


Respiratory 20





Rate 


 


Blood Pressure 123/80














- Physical


General Appearance: Yes: Moderate Distress


HEENTM: Yes: EOMI, Normal ENT Inspection, Normal Voice, LEXIS


Respiratory: Yes: Lungs Clear, Normal Breath Sounds, No Respiratory Distress


Neck: Yes: Supple


Breast: Yes: Breast Exam Deferred


Cardiology: Yes: Regular Rhythm, Regular Rate, S1, S2


Abdominal: Yes: Normal Bowel Sounds, Soft


Genitourinary: Yes: Within Normal Limits


Back: Yes: Normal Inspection


Musculoskeletal: Yes: Back pain, Muscle Pain, Muscle weakness


Extremities: Yes: Normal Inspection


Neurological: Yes: Alert, Normal Mood/Affect, Normal Response


Integumentary: Yes: Warm


Lymphatic: Yes: Within Normal Limits





- Diagnostic


(1) Cocaine dependence


Current Visit: Yes   Status: Chronic   


Qualifiers: 


   Substance use status: uncomplicated   Qualified Code(s): F14.20 - Cocaine 

dependence, uncomplicated   





(2) Marihuana dependence


Current Visit: Yes   Status: Chronic   





(3) Nicotine dependence


Current Visit: Yes   Status: Chronic   


Qualifiers: 


   Nicotine product type: cigarettes   Substance use status: in withdrawal   

Qualified Code(s): F17.213 - Nicotine dependence, cigarettes, with withdrawal   





(4) Opioid dependence with withdrawal


Current Visit: Yes   Status: Chronic   





(5) Asthma


Current Visit: Yes   Status: Chronic   


Qualifiers: 


   Asthma severity: mild intermittent   Asthma complication type: uncomplicated 





(6) HIV (human immunodeficiency virus infection)


Current Visit: Yes   Status: Chronic   





(7) History of positive PPD


Current Visit: Yes   Status: Chronic   





(8) Neuropathy


Current Visit: Yes   Status: Chronic   





Cleared for Admission [facility]





- Detox or Rehab


[facility] Level of Care: Medically Managed


Detox Regimen/Protocol: Methadone





[facility] Breath Alcohol Content


Breath Alcohol Content: 0





Urine Drug Screen





- Results


Drug Screen Negative: No


Urine Drug Screen Results: THC-Marijuana, TRE-Cocaine, OPI-Opiates, OXY-

Oxycodone

## 2018-03-08 LAB
ALBUMIN SERPL-MCNC: 2.9 G/DL (ref 3.4–5)
ALP SERPL-CCNC: 90 U/L (ref 45–117)
ALT SERPL-CCNC: 12 U/L (ref 12–78)
ANION GAP SERPL CALC-SCNC: 8 MMOL/L (ref 8–16)
AST SERPL-CCNC: 12 U/L (ref 15–37)
BILIRUB SERPL-MCNC: 0.6 MG/DL (ref 0.2–1)
BUN SERPL-MCNC: 12 MG/DL (ref 7–18)
CALCIUM SERPL-MCNC: 8.6 MG/DL (ref 8.5–10.1)
CHLORIDE SERPL-SCNC: 109 MMOL/L (ref 98–107)
CO2 SERPL-SCNC: 29 MMOL/L (ref 21–32)
CREAT SERPL-MCNC: 0.8 MG/DL (ref 0.7–1.3)
DEPRECATED RDW RBC AUTO: 16 % (ref 11.9–15.9)
GLUCOSE SERPL-MCNC: 93 MG/DL (ref 74–106)
HCT VFR BLD CALC: 37.8 % (ref 35.4–49)
HGB BLD-MCNC: 12.6 GM/DL (ref 11.7–16.9)
MCH RBC QN AUTO: 28 PG (ref 25.7–33.7)
MCHC RBC AUTO-ENTMCNC: 33.3 G/DL (ref 32–35.9)
MCV RBC: 84 FL (ref 80–96)
PLATELET # BLD AUTO: 300 K/MM3 (ref 134–434)
PMV BLD: 8.1 FL (ref 7.5–11.1)
POTASSIUM SERPLBLD-SCNC: 3.8 MMOL/L (ref 3.5–5.1)
PROT SERPL-MCNC: 6 G/DL (ref 6.4–8.2)
RBC # BLD AUTO: 4.5 M/MM3 (ref 4–5.6)
SODIUM SERPL-SCNC: 146 MMOL/L (ref 136–145)
WBC # BLD AUTO: 6.9 K/MM3 (ref 4–10)

## 2018-03-08 RX ADMIN — GABAPENTIN SCH MG: 100 CAPSULE ORAL at 22:21

## 2018-03-08 RX ADMIN — Medication SCH TAB: at 10:24

## 2018-03-08 RX ADMIN — GABAPENTIN SCH MG: 100 CAPSULE ORAL at 10:24

## 2018-03-08 RX ADMIN — NICOTINE SCH: 14 PATCH, EXTENDED RELEASE TRANSDERMAL at 10:24

## 2018-03-08 RX ADMIN — Medication SCH MG: at 22:21

## 2018-03-08 NOTE — PN
[facility] Progress Note


Note: 





Psychiatric nurse practitioner note:


At approximately 15:10 patient approached nursing station and asked writer and 

RN Faby, "Can you hang with wet or dry toliet paper." Pt. was implying if 

someone can hang themselves with wet or dry toliet paper. Pt.was then asked if 

was having thoughts or urges to hurt himself. Pt. denied. Pt. stating to writer

, " this is a question i ask to all psychiatrist and people that work in psych 

because they always ponder when i ask them that." Pt. then walked into his 

restroom, wet a small amount of toliet paper, and demonstrated how easily it 

rips apart. Pt. then stated, "No you can not hang yourself. Pt. was then asked 

again if he was having thoughts to hurt himself or others and he replied, " I 

would never hurt myself. it's just a question i like to ask mental health 

professionals." Patient currently presents as content, affect congruent. . Pt's 

denies h/o suicide attempt. Pt. was seen earlier for a psychiatric consultation 

and reported a history of one psychiatric hospitalization approximately 10 

years ago. Stated he was diagnosed with schizoaffective disorder but is 

nonadherent to medications and outpatient care. Pt. made aware that comments 

pertaining of someone wanting to hurt himself or others warrants immediate 

evaluation. Pt. apologetic. No psychosis or depression noted upon previous or 

current evaluation. Will continue to monitor.

## 2018-03-08 NOTE — EKG
Test Reason : 

Blood Pressure : ***/*** mmHG

Vent. Rate : 066 BPM     Atrial Rate : 066 BPM

   P-R Int : 116 ms          QRS Dur : 112 ms

    QT Int : 404 ms       P-R-T Axes : 082 077 066 degrees

   QTc Int : 423 ms

 

NORMAL SINUS RHYTHM WITH SINUS ARRHYTHMIA

INCOMPLETE RIGHT BUNDLE BRANCH BLOCK

BORDERLINE ECG

WHEN COMPARED WITH ECG OF 22-OCT-2017 15:06,

NO SIGNIFICANT CHANGE WAS FOUND

Confirmed by KATHRYN FIORE, STEPHAN ([id number]) on 3/8/2018 12:26:30 PM

 

Referred By: JUSTIN CARRASCO           Confirmed By:STEPHAN PERALTA MD

## 2018-03-08 NOTE — CONSULT
[facility] Psychiatric Consult





- Data


Date of interview: 03/08/18


Admission source: [facility]


Identifying data: Pt. is a 52 year old male, , unemployed, disabled, and 

receiving fiancial assitance from welfare. This is one of multiple admissions 

for patient. Pt. admitted to  for cocaine, heroin and marijuana dependence.


Substance Abuse History: Following information confirmed with Mr. Ocasio:  

Smoking Cessation.  Smoking history: Current every day smoker.  Have you smoked 

in the past 12 months: Yes.  Aproximately how many cigarettes per day: 5.  Hx 

Chewing Tobacco Use: No.  Initiated information on smoking cessation: Yes.  '

Breaking Loose' booklet given: 03/07/18.  - Substance & Tx. History.  Hx 

Alcohol Use: No.  Hx Substance Use: Yes.  Substance Use Type: Cocaine, Heroin, 

Marijuana.  Hx Substance Use Treatment: Yes ([facility] 10/22 - 10/27/2017).  - 

Substances Abused.  ** Heroin.  Route: Inhalation.  Frequency: Daily.  Amount 

used: 2 bags.  Age of first use: 35.  Date of Last Use: 03/06/18.  ** Cocaine.  

Route: Smoking.  Frequency: 1-2 times per week.  Amount used: 3 bags.  Age of 

first use: 15.  Date of Last Use: 03/03/18.  ** Marijuana/Hashish.  Route: 

Smoking.  Frequency: 3-6 times per week.  Amount used: $10.  Age of first use: 

11.  Date of Last Use: 03/02/18


Medical History: Asthma, HIV


Psychiatric History: Pt. reports one psychiatric hospitalization approximately 

10 years ago. Pt. unable to recall the hospital. States he was diagnosed with 

schizoaffective disorder. Pt. reports outpatient care five years ago at [place] the [place]. Pt. was prescribed zyprexa and wellbutrin. Pt. 

currently reports nonadherence to medication and outpatient care. Pt. denies h/

o suicide attempts.


Physical/Sexual Abuse/Trauma History: Denies.





Mental Status Exam





- Mental Status Exam


Alert and Oriented to: Time, Place, Person


Cognitive Function: Good


Patient Appearance: Well Groomed


Mood: Withdrawn, Euthymic


Affect: Mood Congruent


Patient Behavior: Guarded, Cooperative


Speech Pattern: Appropriate


Voice Loudness: Normal


Thought Process: Goal Oriented


Thought Disorder: Not Present


Hallucinations: Denies


Suicidal Ideation: Denies


Homicidal Ideation: Denies


Insight/Judgement: Poor


Sleep: Fair


Appetite: Fair


Muscle strength/Tone: Normal


Gait/Station: Normal





Psychiatric Findings





- Problem List (Axis 1, 2,3)


(1) Cocaine dependence


Current Visit: Yes   Status: Chronic   


Qualifiers: 


   Substance use status: uncomplicated   Qualified Code(s): F14.20 - Cocaine 

dependence, uncomplicated   





(2) Marihuana dependence


Current Visit: Yes   Status: Chronic   





(3) Nicotine dependence


Current Visit: Yes   Status: Chronic   


Qualifiers: 


   Nicotine product type: cigarettes   Substance use status: in withdrawal   

Qualified Code(s): F17.213 - Nicotine dependence, cigarettes, with withdrawal   





(4) Opioid dependence with withdrawal


Current Visit: Yes   Status: Chronic   





(5) Drug-induced mood disorder


Current Visit: Yes   Status: Suspected   





- Initial Treatment Plan


Initial Treatment Plan: Psychoeducation provided. Detoxification in progress. 

Obsevation.

## 2018-03-08 NOTE — EKG
Test Reason : 

Blood Pressure : ***/*** mmHG

Vent. Rate : 061 BPM     Atrial Rate : 061 BPM

   P-R Int : 112 ms          QRS Dur : 096 ms

    QT Int : 390 ms       P-R-T Axes : [phone] degrees

   QTc Int : 392 ms

 

NORMAL SINUS RHYTHM WITH SINUS ARRHYTHMIA

NORMAL ECG

WHEN COMPARED WITH ECG OF 07-MAR-2018 17:43,

NO SIGNIFICANT CHANGE WAS FOUND

Confirmed by STEPHAN PERALTA MD ([id number]) on 3/8/2018 1:41:18 PM

 

Referred By:             Confirmed By:STEPHAN PERALTA MD

## 2018-03-08 NOTE — PN
BHS COWS





- Scale


Resting Pulse: 0= [place] 80 or Below


Sweatin= Chills/Flushing


Restless Observation: 3= Extraneous Movement


Pupil Size: 2= Moderately Dilated


Bone or Joint Aches: 4=Acute Joint/Muscle Pain


Runny Nose/ Eye Tearin= Nasal Congestion


GI Upset > 30mins: 0= None


Tremor Observation of Outstretched Hands: 1= Tremor Felt, Not Seen


Yawning Observation: 2= >3x During Session


Anxiety or Irritability: 2=Irritable/Anxious


Goose Flesh Skin: 0=Smooth Skin


COWS Score: 16





BHS Progress Note (SOAP)


Subjective: 





ANXIETY,SWEATS,TREMORS.


Objective: 





18 12:26


 Vital Signs











Temperature  97.7 F   18 09:28


 


Pulse Rate  89   18 09:28


 


Respiratory Rate  18   18 09:28


 


Blood Pressure  121/76   18 09:28


 


O2 Sat by Pulse Oximetry (%)      








 Laboratory Last Values











Urine Color  Dkyellow   18  19:45    


 


Urine Appearance  Slcloudy   18  19:45    


 


Urine pH  5.0  (5.0-8.0)   18  19:45    


 


Ur Specific Gravity  1.019  (1.001-1.035)   18  19:45    


 


Urine Protein  Negative  (NEGATIVE)   18  19:45    


 


Urine Glucose (UA)  Negative  (NEGATIVE)   18  19:45    


 


Urine Ketones  Negative  (NEGATIVE)   18  19:45    


 


Urine Blood  1+  (NEGATIVE)  H  18  19:45    


 


Urine Nitrite  Negative  (NEGATIVE)   18  19:45    


 


Urine Bilirubin  Negative  (NEGATIVE)   18  19:45    


 


Urine Urobilinogen  Negative mg/dL (0.2-1.0)   18  19:45    


 


Ur Leukocyte Esterase  Negative  (NEGATIVE)   18  19:45    


 


Urine WBC (Auto)  <1 /hpf (3-5)   18  19:45    


 


Urine RBC (Auto)  13 /hpf (0-3)   18  19:45    


 


Ur Epithelial Cells  Rare /HPF (FEW)   18  19:45    


 


Urine Mucus  Many   18  19:45    








OTHER LABS PENDING


Assessment: 





18 12:26


WITHDRAWAL SX


Plan: 





CONTINUE DETOX

## 2018-03-09 RX ADMIN — Medication SCH TAB: at 09:29

## 2018-03-09 RX ADMIN — Medication SCH MG: at 22:14

## 2018-03-09 RX ADMIN — GABAPENTIN SCH MG: 100 CAPSULE ORAL at 22:15

## 2018-03-09 RX ADMIN — GABAPENTIN SCH MG: 100 CAPSULE ORAL at 09:29

## 2018-03-09 RX ADMIN — NICOTINE SCH: 14 PATCH, EXTENDED RELEASE TRANSDERMAL at 09:29

## 2018-03-09 NOTE — PN
[facility] COWS





- Scale


Resting Pulse: 0= [place] 80 or Below


Sweatin= Chills/Flushing


Restless Observation: 3= Extraneous Movement


Pupil Size: 2= Moderately Dilated


Bone or Joint Aches: 1= Mild Discomfort


Runny Nose/ Eye Tearin= None


GI Upset > 30mins: 0= None


Tremor Observation of Outstretched Hands: 2= Slight Tremor Visible


Yawning Observation: 1= 1-2x During Session


Anxiety or Irritability: 2=Irritable/Anxious


Goose Flesh Skin: 0=Smooth Skin


COWS Score: 12





BHS Progress Note (SOAP)


Subjective: 





PT LAYING CALM IN BED. DETOX PROCEEDING WELL. STATES SLEPT WELL LAST NIGHT.


Objective: 





18 10:24


 Vital Signs











Temperature  97.1 F L  18 06:16


 


Pulse Rate  65   18 06:16


 


Respiratory Rate  16   18 06:16


 


Blood Pressure  126/74   18 06:16


 


O2 Sat by Pulse Oximetry (%)      








 Laboratory Last Values











WBC  6.9 K/mm3 (4.0-10.0)   18  11:30    


 


RBC  4.50 M/mm3 (4.00-5.60)   18  11:30    


 


Hgb  12.6 GM/dL (11.7-16.9)   18  11:30    


 


Hct  37.8 % (35.4-49)   18  11:30    


 


MCV  84.0 fl (80-96)   18  11:30    


 


MCH  28.0 pg (25.7-33.7)   18  11:30    


 


MCHC  33.3 g/dl (32.0-35.9)   18  11:30    


 


RDW  16.0 % (11.9-15.9)  H  18  11:30    


 


Plt Count  300 K/MM3 (134-434)   18  11:30    


 


MPV  8.1 fl (7.5-11.1)   18  11:30    


 


Sodium  146 mmol/L (136-145)  H  18  11:30    


 


Potassium  3.8 mmol/L (3.5-5.1)   18  11:30    


 


Chloride  109 mmol/L ()  H  18  11:30    


 


Carbon Dioxide  29 mmol/L (21-32)   18  11:30    


 


Anion Gap  8  (8-16)   18  11:30    


 


BUN  12 mg/dL (7-18)   18  11:30    


 


Creatinine  0.8 mg/dL (0.7-1.3)   18  11:30    


 


Creat Clearance w eGFR  > 60  (>60)   18  11:30    


 


Random Glucose  93 mg/dL ()   18  11:30    


 


Calcium  8.6 mg/dL (8.5-10.1)   18  11:30    


 


Total Bilirubin  0.6 mg/dL (0.2-1.0)   18  11:30    


 


AST  12 U/L (15-37)  L  18  11:30    


 


ALT  12 U/L (12-78)  D 18  11:30    


 


Alkaline Phosphatase  90 U/L ()   18  11:30    


 


Total Protein  6.0 g/dl (6.4-8.2)  L  18  11:30    


 


Albumin  2.9 g/dl (3.4-5.0)  L  18  11:30    


 


Urine Color  Dkyellow   18  19:45    


 


Urine Appearance  Slcloudy   18  19:45    


 


Urine pH  5.0  (5.0-8.0)   18  19:45    


 


Ur Specific Gravity  1.019  (1.001-1.035)   18  19:45    


 


Urine Protein  Negative  (NEGATIVE)   18  19:45    


 


Urine Glucose (UA)  Negative  (NEGATIVE)   18  19:45    


 


Urine Ketones  Negative  (NEGATIVE)   18  19:45    


 


Urine Blood  1+  (NEGATIVE)  H  18  19:45    


 


Urine Nitrite  Negative  (NEGATIVE)   18  19:45    


 


Urine Bilirubin  Negative  (NEGATIVE)   18  19:45    


 


Urine Urobilinogen  Negative mg/dL (0.2-1.0)   18  19:45    


 


Ur Leukocyte Esterase  Negative  (NEGATIVE)   18  19:45    


 


Urine WBC (Auto)  <1 /hpf (3-5)   18  19:45    


 


Urine RBC (Auto)  13 /hpf (0-3)   18  19:45    


 


Ur Epithelial Cells  Rare /HPF (FEW)   18  19:45    


 


Urine Mucus  Many   18  19:45    








LABS NOTED


Assessment: 





18 10:25


WITHDRAWAL SX


Plan: 





CONTINUE DETOX


REPEAT UA;UC TODAY

## 2018-03-10 LAB
APPEARANCE UR: CLEAR
BILIRUB UR STRIP.AUTO-MCNC: NEGATIVE MG/DL
COLOR UR: (no result)
KETONES UR QL STRIP: NEGATIVE
LEUKOCYTE ESTERASE UR QL STRIP.AUTO: NEGATIVE
NITRITE UR QL STRIP: NEGATIVE
PH UR: 7 [PH] (ref 5–8)
PROT UR QL STRIP: NEGATIVE
PROT UR QL STRIP: NEGATIVE
RBC # UR STRIP: NEGATIVE /UL
SP GR UR: 1.01 (ref 1–1.03)
UROBILINOGEN UR STRIP-MCNC: NEGATIVE MG/DL (ref 0.2–1)

## 2018-03-10 RX ADMIN — NICOTINE SCH: 14 PATCH, EXTENDED RELEASE TRANSDERMAL at 10:10

## 2018-03-10 RX ADMIN — Medication SCH MG: at 22:16

## 2018-03-10 RX ADMIN — GABAPENTIN SCH MG: 100 CAPSULE ORAL at 22:16

## 2018-03-10 RX ADMIN — GABAPENTIN SCH MG: 100 CAPSULE ORAL at 10:09

## 2018-03-10 RX ADMIN — Medication SCH TAB: at 10:09

## 2018-03-10 NOTE — PN
B[facility] Progress Note (SOAP)


Subjective: 





Sweating, Fatigue, Body Aches.


Objective: 


PATIENT A & O X 3, OBSERVED AMBULATING ON UNIT. NO ACUTE DISTRESS.





03/10/18 15:59


 Vital Signs











Temperature  97.1 F L  03/10/18 14:08


 


Pulse Rate  77   03/10/18 14:08


 


Respiratory Rate  18   03/10/18 14:08


 


Blood Pressure  144/82   03/10/18 14:08


 


O2 Sat by Pulse Oximetry (%)      








 Laboratory Tests











  03/07/18 03/08/18 03/08/18





  19:45 11:30 11:30


 


WBC   6.9 


 


RBC   4.50 


 


Hgb   12.6 


 


Hct   37.8 


 


MCV   84.0 


 


MCH   28.0 


 


MCHC   33.3 


 


RDW   16.0 H 


 


Plt Count   300 


 


MPV   8.1 


 


Sodium    146 H


 


Potassium    3.8


 


Chloride    109 H


 


Carbon Dioxide    29


 


Anion Gap    8


 


BUN    12


 


Creatinine    0.8


 


Creat Clearance w eGFR    > 60


 


Random Glucose    93


 


Calcium    8.6


 


Total Bilirubin    0.6


 


AST    12 L


 


ALT    12  D


 


Alkaline Phosphatase    90


 


Total Protein    6.0 L


 


Albumin    2.9 L


 


Urine Color  Dkyellow  


 


Urine Appearance  Slcloudy  


 


Urine pH  5.0  


 


Ur Specific Gravity  1.019  


 


Urine Protein  Negative  


 


Urine Glucose (UA)  Negative  


 


Urine Ketones  Negative  


 


Urine Blood  1+ H  


 


Urine Nitrite  Negative  


 


Urine Bilirubin  Negative  


 


Urine Urobilinogen  Negative  


 


Ur Leukocyte Esterase  Negative  


 


Urine WBC (Auto)  <1  


 


Urine RBC (Auto)  13  


 


Ur Epithelial Cells  Rare  


 


Urine Mucus  Many  


 


RPR Titer   














  03/08/18 03/10/18





  11:30 08:10


 


WBC  


 


RBC  


 


Hgb  


 


Hct  


 


MCV  


 


MCH  


 


MCHC  


 


RDW  


 


Plt Count  


 


MPV  


 


Sodium  


 


Potassium  


 


Chloride  


 


Carbon Dioxide  


 


Anion Gap  


 


BUN  


 


Creatinine  


 


Creat Clearance w eGFR  


 


Random Glucose  


 


Calcium  


 


Total Bilirubin  


 


AST  


 


ALT  


 


Alkaline Phosphatase  


 


Total Protein  


 


Albumin  


 


Urine Color   Straw


 


Urine Appearance   Clear


 


Urine pH   7.0  D


 


Ur Specific Gravity   1.010


 


Urine Protein   Negative


 


Urine Glucose (UA)   Negative


 


Urine Ketones   Negative


 


Urine Blood   Negative


 


Urine Nitrite   Negative


 


Urine Bilirubin   Negative


 


Urine Urobilinogen   Negative


 


Ur Leukocyte Esterase   Negative


 


Urine WBC (Auto)  


 


Urine RBC (Auto)  


 


Ur Epithelial Cells  


 


Urine Mucus  


 


RPR Titer  Nonreactive 








labs noted.


Assessment: 





03/10/18 15:59


WITHDRAWAL SYMPTOMS.


Plan: 





CONTINUE DETOX.

## 2018-03-11 RX ADMIN — GABAPENTIN SCH MG: 100 CAPSULE ORAL at 10:21

## 2018-03-11 RX ADMIN — GABAPENTIN SCH: 100 CAPSULE ORAL at 23:37

## 2018-03-11 RX ADMIN — NICOTINE SCH: 14 PATCH, EXTENDED RELEASE TRANSDERMAL at 10:22

## 2018-03-11 RX ADMIN — Medication SCH: at 23:37

## 2018-03-11 RX ADMIN — Medication SCH TAB: at 10:21

## 2018-03-12 VITALS — DIASTOLIC BLOOD PRESSURE: 76 MMHG | HEART RATE: 60 BPM | SYSTOLIC BLOOD PRESSURE: 127 MMHG | TEMPERATURE: 97.6 F

## 2018-03-12 NOTE — PN
BHS Progress Note (SOAP)


Subjective: 





Completed detox


Objective: 





03/12/18 11:14





A & O x 3


No acute distress noted


 Vital Signs











Temperature  97.6 F   03/12/18 06:34


 


Pulse Rate  60   03/12/18 06:34


 


Respiratory Rate  18   03/12/18 06:34


 


Blood Pressure  127/76   03/12/18 06:34


 


O2 Sat by Pulse Oximetry (%)      














Assessment: 





03/12/18 11:14


Successful completion of detox


Plan: 





D/c patient

## 2018-03-12 NOTE — DS
BHS Detox Discharge Summary


Admission Date: 


03/07/18





Discharge Date: 03/12/18





- History


Additional Comments: 





Pt A & O x 3, discharged today home. Pt states he has an appt  with his pain 

management doctor today and will f/u with his PMD sometime in the week,


both at [facility], [address], [place]. 


Pt will f/u with PMD re antivirals, states intends to attend NA meetings and 

hang out more with his brother who has been 30 years clean as aftercare plans.


Prescription Albuterol and Gabapentin sent to pt's pharmacy.


Pertinent Past History: 





HIV


Neuropathy





- Physical Exam Results


Vital Signs: 


 Vital Signs











Temperature  97.6 F   03/12/18 06:34


 


Pulse Rate  60   03/12/18 06:34


 


Respiratory Rate  18   03/12/18 06:34


 


Blood Pressure  127/76   03/12/18 06:34


 


O2 Sat by Pulse Oximetry (%)      











Pertinent Admission Physical Exam Findings: 





withdrawal sx





- Treatment


Hospital Course: Detox Protocol Followed, Detoxed Safely, Responded well, 

Discharged Condition Good


Patient has Accepted a Rehab Referral to: NA meetings





- Medication


Discharge Medications: 


Ambulatory Orders





Albuterol Sulfate Inhaler - [Ventolin HFA Inhaler -] 2 inh PO Q6H #1 inhaler 03/ 12/18 


Gabapentin [Neurontin -] 100 mg PO BID 15 Days #30 capsule 03/12/18 











- Diagnosis


(1) Opioid dependence with withdrawal


Current Visit: Yes   Status: Acute   





(2) Asthma


Current Visit: Yes   Status: Chronic   


Qualifiers: 


   Asthma severity: mild   Asthma persistence: intermittent   Asthma 

complication type: uncomplicated   Qualified Code(s): J45.20 - Mild 

intermittent asthma, uncomplicated   





(3) Cannabis dependence


Current Visit: Yes   Status: Chronic   





(4) Cocaine dependence


Current Visit: Yes   Status: Chronic   


Qualifiers: 


   Substance use status: uncomplicated   Qualified Code(s): F14.20 - Cocaine 

dependence, uncomplicated   





(5) HIV (human immunodeficiency virus infection)


Current Visit: Yes   Status: Chronic   





(6) History of positive PPD


Current Visit: Yes   Status: Chronic   





(7) Marihuana dependence


Current Visit: Yes   Status: Chronic   





(8) Neuropathy


Current Visit: Yes   Status: Chronic   





(9) Nicotine dependence


Current Visit: Yes   Status: Chronic   


Qualifiers: 


   Nicotine product type: cigarettes   Substance use status: in withdrawal   

Qualified Code(s): F17.213 - Nicotine dependence, cigarettes, with withdrawal   





(10) Drug-induced mood disorder


Current Visit: Yes   Status: Suspected   





- AMA


Did Patient Leave Against Medical Advice: No

## 2021-09-02 NOTE — PN
[facility]HS COWS





- Scale


Resting Pulse: 0= [place] 80 or Below


Sweatin= Chills/Flushing


Restless Observation: 0= Sits Still


Pupil Size: 0= Normal to Room Light


Bone or Joint Aches: 2= Severe Diffuse Aches


Runny Nose/ Eye Tearin= Nasal Congestion


GI Upset > 30mins: 2= Nausea/Diarrhea


Tremor Observation of Outstretched Hands: 2= Slight Tremor Visible


Yawning Observation: 1= 1-2x During Session


Anxiety or Irritability: 2=Irritable/Anxious


Goose Flesh Skin: 3=Piloerection


COWS Score: 14





BHS Progress Note (SOAP)


Subjective: 


Body Aches, Diarrhea, Fatigue, Tremors.


Objective: 


PT. A & O X 3, OBSERVED AMBULATING ON UNIT WITH ASSISTANCE OF A CANE. NO ACUTE 

DISTRESS.





17 12:06


 Vital Signs











Temperature  96.7 F L  17 09:14


 


Pulse Rate  63   17 09:14


 


Respiratory Rate  18   17 09:14


 


Blood Pressure  121/77   17 09:14


 


O2 Sat by Pulse Oximetry (%)      








 Laboratory Tests











  17





  Unknown 07:30 07:30


 


WBC   6.6 


 


RBC   4.53 


 


Hgb   12.4 


 


Hct   37.5 


 


MCV   82.9 


 


MCH   27.3 


 


MCHC   32.9 


 


RDW   15.4 


 


Plt Count   241 


 


MPV   8.2 


 


Sodium    143


 


Potassium    3.7


 


Chloride    107


 


Carbon Dioxide    31


 


Anion Gap    5 L


 


BUN    14


 


Creatinine    0.9


 


Creat Clearance w eGFR    > 60


 


Random Glucose    105


 


Calcium    8.3 L


 


Total Bilirubin    0.5


 


AST    13 L


 


ALT    10 L


 


Alkaline Phosphatase    78


 


Total Protein    5.7 L


 


Albumin    3.0 L


 


Urine Color  Dkyellow  


 


Urine Appearance  Clear  


 


Urine pH  5.0  


 


Ur Specific Gravity  >= 1.030 H  


 


Urine Protein  1+ H  


 


Urine Glucose (UA)  Negative  


 


Urine Ketones  Trace H  


 


Urine Blood  Negative  


 


Urine Nitrite  Negative  


 


Urine Bilirubin  Negative  


 


Urine Urobilinogen  Negative  


 


Ur Leukocyte Esterase  Negative  


 


Urine RBC  4  


 


Urine WBC  2  


 


Ur Epithelial Cells  Rare  


 


Calcium Oxalate Crystal  Moderate  


 


Urine Bacteria  Rare  


 


Hyaline Casts  2  


 


Urine Mucus  Moderate  


 


RPR Titer   


 


HIV 1&2 Antibody Screen   


 


HIV P24 Antigen   














  17





  07:30


 


WBC 


 


RBC 


 


Hgb 


 


Hct 


 


MCV 


 


MCH 


 


MCHC 


 


RDW 


 


Plt Count 


 


MPV 


 


Sodium 


 


Potassium 


 


Chloride 


 


Carbon Dioxide 


 


Anion Gap 


 


BUN 


 


Creatinine 


 


Creat Clearance w eGFR 


 


Random Glucose 


 


Calcium 


 


Total Bilirubin 


 


AST 


 


ALT 


 


Alkaline Phosphatase 


 


Total Protein 


 


Albumin 


 


Urine Color 


 


Urine Appearance 


 


Urine pH 


 


Ur Specific Gravity 


 


Urine Protein 


 


Urine Glucose (UA) 


 


Urine Ketones 


 


Urine Blood 


 


Urine Nitrite 


 


Urine Bilirubin 


 


Urine Urobilinogen 


 


Ur Leukocyte Esterase 


 


Urine RBC 


 


Urine WBC 


 


Ur Epithelial Cells 


 


Calcium Oxalate Crystal 


 


Urine Bacteria 


 


Hyaline Casts 


 


Urine Mucus 


 


RPR Titer  Nonreactive


 


HIV 1&2 Antibody Screen  Negative


 


HIV P24 Antigen  Negative








LABS NOTED.


Assessment: 





17 12:07


WITHDRAWAL SYMPTOMS.


Plan: 


CONTINUE DETOX.
BHS Progress Note (SOAP)


Subjective: 


Anxious, sweating, interrupted sleep


Objective: 





07/23/17 13:45


 Last Vital Signs











Temp Pulse Resp BP Pulse Ox


 


 98.0 F   63   18   128/81    


 


 07/23/17 10:00  07/23/17 10:00  07/23/17 10:00  07/23/17 10:00   








 Laboratory Tests











  07/21/17 07/22/17 07/22/17





  Unknown 07:30 07:30


 


WBC   6.6 


 


RBC   4.53 


 


Hgb   12.4 


 


Hct   37.5 


 


MCV   82.9 


 


MCH   27.3 


 


MCHC   32.9 


 


RDW   15.4 


 


Plt Count   241 


 


MPV   8.2 


 


Sodium    143


 


Potassium    3.7


 


Chloride    107


 


Carbon Dioxide    31


 


Anion Gap    5 L


 


BUN    14


 


Creatinine    0.9


 


Creat Clearance w eGFR    > 60


 


Random Glucose    105


 


Calcium    8.3 L


 


Total Bilirubin    0.5


 


AST    13 L


 


ALT    10 L


 


Alkaline Phosphatase    78


 


Total Protein    5.7 L


 


Albumin    3.0 L


 


Urine Color  Dkyellow  


 


Urine Appearance  Clear  


 


Urine pH  5.0  


 


Ur Specific Gravity  >= 1.030 H  


 


Urine Protein  1+ H  


 


Urine Glucose (UA)  Negative  


 


Urine Ketones  Trace H  


 


Urine Blood  Negative  


 


Urine Nitrite  Negative  


 


Urine Bilirubin  Negative  


 


Urine Urobilinogen  Negative  


 


Ur Leukocyte Esterase  Negative  


 


Urine RBC  4  


 


Urine WBC  2  


 


Ur Epithelial Cells  Rare  


 


Calcium Oxalate Crystal  Moderate  


 


Urine Bacteria  Rare  


 


Hyaline Casts  2  


 


Urine Mucus  Moderate  


 


RPR Titer   


 


HIV 1&2 Antibody Screen   


 


HIV P24 Antigen   














  07/22/17





  07:30


 


WBC 


 


RBC 


 


Hgb 


 


Hct 


 


MCV 


 


MCH 


 


MCHC 


 


RDW 


 


Plt Count 


 


MPV 


 


Sodium 


 


Potassium 


 


Chloride 


 


Carbon Dioxide 


 


Anion Gap 


 


BUN 


 


Creatinine 


 


Creat Clearance w eGFR 


 


Random Glucose 


 


Calcium 


 


Total Bilirubin 


 


AST 


 


ALT 


 


Alkaline Phosphatase 


 


Total Protein 


 


Albumin 


 


Urine Color 


 


Urine Appearance 


 


Urine pH 


 


Ur Specific Gravity 


 


Urine Protein 


 


Urine Glucose (UA) 


 


Urine Ketones 


 


Urine Blood 


 


Urine Nitrite 


 


Urine Bilirubin 


 


Urine Urobilinogen 


 


Ur Leukocyte Esterase 


 


Urine RBC 


 


Urine WBC 


 


Ur Epithelial Cells 


 


Calcium Oxalate Crystal 


 


Urine Bacteria 


 


Hyaline Casts 


 


Urine Mucus 


 


RPR Titer  Nonreactive


 


HIV 1&2 Antibody Screen  Negative


 


HIV P24 Antigen  Negative








Labs noted: UA abnormal


Assessment: 





07/23/17 13:46


Withdrawal symptoms





Noted with abnormal UA


Plan: 


Continue detox





Abnormal UA: encouraged to drink lots of water, repeat UA
BHS Progress Note (SOAP)


Subjective: 


DECREASED ANXIETY,SWEATS,TREMORS.


Objective: 





07/25/17 11:01


 Vital Signs











Temperature  97.2 F L  07/25/17 09:30


 


Pulse Rate  62   07/25/17 09:30


 


Respiratory Rate  18   07/25/17 09:30


 


Blood Pressure  126/79   07/25/17 09:30


 


O2 Sat by Pulse Oximetry (%)      








 Laboratory Last Values











WBC  6.6 K/mm3 (4.0-10.0)   07/22/17  07:30    


 


RBC  4.53 M/mm3 (4.00-5.60)   07/22/17  07:30    


 


Hgb  12.4 GM/dL (11.7-16.9)   07/22/17  07:30    


 


Hct  37.5 % (35.4-49)   07/22/17  07:30    


 


MCV  82.9 fl (80-96)   07/22/17  07:30    


 


MCH  27.3 pg (25.7-33.7)   07/22/17  07:30    


 


MCHC  32.9 g/dl (32.0-35.9)   07/22/17  07:30    


 


RDW  15.4 % (11.9-15.9)   07/22/17  07:30    


 


Plt Count  241 K/MM3 (134-434)   07/22/17  07:30    


 


MPV  8.2 fl (7.5-11.1)   07/22/17  07:30    


 


Sodium  143 mmol/L (136-145)   07/22/17  07:30    


 


Potassium  3.7 mmol/L (3.5-5.1)   07/22/17  07:30    


 


Chloride  107 mmol/L ()   07/22/17  07:30    


 


Carbon Dioxide  31 mmol/L (21-32)   07/22/17  07:30    


 


Anion Gap  5  (8-16)  L  07/22/17  07:30    


 


BUN  14 mg/dL (7-18)   07/22/17  07:30    


 


Creatinine  0.9 mg/dL (0.7-1.3)   07/22/17  07:30    


 


Creat Clearance w eGFR  > 60  (>60)   07/22/17  07:30    


 


Random Glucose  105 mg/dL ()   07/22/17  07:30    


 


Calcium  8.3 mg/dL (8.5-10.1)  L  07/22/17  07:30    


 


Total Bilirubin  0.5 mg/dL (0.2-1.0)   07/22/17  07:30    


 


AST  13 U/L (15-37)  L  07/22/17  07:30    


 


ALT  10 U/L (12-78)  L  07/22/17  07:30    


 


Alkaline Phosphatase  78 U/L ()   07/22/17  07:30    


 


Total Protein  5.7 g/dl (6.4-8.2)  L  07/22/17  07:30    


 


Albumin  3.0 g/dl (3.4-5.0)  L  07/22/17  07:30    


 


Urine Color  Ltyellow   07/24/17  13:10    


 


Urine Appearance  Clear   07/24/17  13:10    


 


Urine pH  7.0  (5.0-8.0)  D 07/24/17  13:10    


 


Ur Specific Gravity  1.020  (1.005-1.025)   07/24/17  13:10    


 


Urine Protein  Negative  (NEGATIVE)   07/24/17  13:10    


 


Urine Glucose (UA)  Negative  (NEGATIVE)   07/24/17  13:10    


 


Urine Ketones  Negative  (NEGATIVE)   07/24/17  13:10    


 


Urine Blood  Negative  (NEGATIVE)   07/24/17  13:10    


 


Urine Nitrite  Negative  (NEGATIVE)   07/24/17  13:10    


 


Urine Bilirubin  Negative  (NEGATIVE)   07/24/17  13:10    


 


Urine Urobilinogen  Negative mg/dL (0.2-1.0)   07/24/17  13:10    


 


Ur Leukocyte Esterase  Negative  (NEGATIVE)   07/24/17  13:10    


 


Urine RBC  4 /hpf (0-3)   07/21/17  Unknown


 


Urine WBC  2 /hpf (3-5)   07/21/17  Unknown


 


Ur Epithelial Cells  Rare /hpf (FEW)   07/21/17  Unknown


 


Calcium Oxalate Crystal  Moderate /hpf (NONE SEEN)   07/21/17  Unknown


 


Urine Bacteria  Rare /hpf (NONE SEEN)   07/21/17  Unknown


 


Hyaline Casts  2 /lpf  07/21/17  Unknown


 


Urine Mucus  Moderate   07/21/17  Unknown


 


RPR Titer  Nonreactive  (NONREACTIVE)   07/22/17  07:30    


 


HIV 1&2 Antibody Screen  Negative   07/22/17  07:30    


 


HIV P24 Antigen  Negative   07/22/17  07:30    











Assessment: 





07/25/17 11:01


WITHDRAWAL SX


Plan: 


CONTINUE DETOX
[facility] CIWA





- CIWA Score


Nausea/Vomitin-Mild Nausea/No Vomiting


Muscle Tremors: 4-Moderate,w/Arms Extend


Anxiety: 2


Agitation: 2


Paroxysmal Sweats: 4-Forehead w/Sweat Beads


Orientation: 0-Oriented


Tacttile Disturbances: 0-None


Auditory Disturbances: 0-None


Visual Disturbances: 3-Moderate Sensitivity


Headache: 0-None Present


CIWA-Ar Total Score: 16





[facility]HS Progress Note (SOAP)


Subjective: 


Diarrhea, Sweating, Tremors.


Objective: 


PT. A & O X 3, OBSERVED AMBULATING ON UNIT WITH ASSISTANCE OF A CANE. NO ACUTE 

DISTRESS.





17 14:17


 Vital Signs











Temperature  97.5 F L  17 10:00


 


Pulse Rate  58 L  17 10:00


 


Respiratory Rate  18   17 10:00


 


Blood Pressure  113/73   17 10:00


 


O2 Sat by Pulse Oximetry (%)      








 Laboratory Tests











  17





  Unknown 07:30 07:30


 


WBC   6.6 


 


RBC   4.53 


 


Hgb   12.4 


 


Hct   37.5 


 


MCV   82.9 


 


MCH   27.3 


 


MCHC   32.9 


 


RDW   15.4 


 


Plt Count   241 


 


MPV   8.2 


 


Sodium    143


 


Potassium    3.7


 


Chloride    107


 


Carbon Dioxide    31


 


Anion Gap    5 L


 


BUN    14


 


Creatinine    0.9


 


Creat Clearance w eGFR    > 60


 


Random Glucose    105


 


Calcium    8.3 L


 


Total Bilirubin    0.5


 


AST    13 L


 


ALT    10 L


 


Alkaline Phosphatase    78


 


Total Protein    5.7 L


 


Albumin    3.0 L


 


Urine Color  Dkyellow  


 


Urine Appearance  Clear  


 


Urine pH  5.0  


 


Ur Specific Gravity  >= 1.030 H  


 


Urine Protein  1+ H  


 


Urine Glucose (UA)  Negative  


 


Urine Ketones  Trace H  


 


Urine Blood  Negative  


 


Urine Nitrite  Negative  


 


Urine Bilirubin  Negative  


 


Urine Urobilinogen  Negative  


 


Ur Leukocyte Esterase  Negative  


 


Urine RBC  4  


 


Urine WBC  2  


 


Ur Epithelial Cells  Rare  


 


Calcium Oxalate Crystal  Moderate  


 


Urine Bacteria  Rare  


 


Hyaline Casts  2  


 


Urine Mucus  Moderate  


 


RPR Titer   


 


HIV 1&2 Antibody Screen   


 


HIV P24 Antigen   














  17





  07:30


 


WBC 


 


RBC 


 


Hgb 


 


Hct 


 


MCV 


 


MCH 


 


MCHC 


 


RDW 


 


Plt Count 


 


MPV 


 


Sodium 


 


Potassium 


 


Chloride 


 


Carbon Dioxide 


 


Anion Gap 


 


BUN 


 


Creatinine 


 


Creat Clearance w eGFR 


 


Random Glucose 


 


Calcium 


 


Total Bilirubin 


 


AST 


 


ALT 


 


Alkaline Phosphatase 


 


Total Protein 


 


Albumin 


 


Urine Color 


 


Urine Appearance 


 


Urine pH 


 


Ur Specific Gravity 


 


Urine Protein 


 


Urine Glucose (UA) 


 


Urine Ketones 


 


Urine Blood 


 


Urine Nitrite 


 


Urine Bilirubin 


 


Urine Urobilinogen 


 


Ur Leukocyte Esterase 


 


Urine RBC 


 


Urine WBC 


 


Ur Epithelial Cells 


 


Calcium Oxalate Crystal 


 


Urine Bacteria 


 


Hyaline Casts 


 


Urine Mucus 


 


RPR Titer  Nonreactive


 


HIV 1&2 Antibody Screen  Negative


 


HIV P24 Antigen  Negative








LABS NOTED.


Assessment: 





17 14:19


WITHDRAWAL SYMPTOMS.


Plan: 


CONTINUE DETOX.
Render In Strict Bullet Format?: No
Initiate Treatment: - \\n- Pending normal labs, plan to start griseofulvin microsize 500 mg tablet: take one tablet PO QD with a healthy fatty meal. Recommend patient to avoid alcohol/Tylenol consumption while on medication.
Continue Regimen: - \\n- ketoconazole shampoo: wash the scalp TIW. Lather and let sit several minutes prior to rinsing. (Prescribed by PCP, defers refills today)
Detail Level: Zone